# Patient Record
Sex: MALE | Race: WHITE | NOT HISPANIC OR LATINO | Employment: FULL TIME
[De-identification: names, ages, dates, MRNs, and addresses within clinical notes are randomized per-mention and may not be internally consistent; named-entity substitution may affect disease eponyms.]

---

## 2018-11-23 ENCOUNTER — IMAGING SERVICES (OUTPATIENT)
Dept: OTHER | Age: 53
End: 2018-11-23

## 2019-01-24 ENCOUNTER — IMAGING SERVICES (OUTPATIENT)
Dept: OTHER | Age: 54
End: 2019-01-24

## 2019-11-07 ENCOUNTER — OFFICE VISIT (OUTPATIENT)
Dept: GASTROENTEROLOGY | Age: 54
End: 2019-11-07

## 2019-11-07 ENCOUNTER — TELEPHONE (OUTPATIENT)
Dept: GASTROENTEROLOGY | Age: 54
End: 2019-11-07

## 2019-11-07 VITALS
HEIGHT: 70 IN | SYSTOLIC BLOOD PRESSURE: 126 MMHG | DIASTOLIC BLOOD PRESSURE: 74 MMHG | WEIGHT: 150 LBS | BODY MASS INDEX: 21.47 KG/M2

## 2019-11-07 DIAGNOSIS — Z12.11 SPECIAL SCREENING FOR MALIGNANT NEOPLASMS, COLON: ICD-10-CM

## 2019-11-07 DIAGNOSIS — R14.0 ABDOMINAL BLOATING: Primary | ICD-10-CM

## 2019-11-07 DIAGNOSIS — Z80.0 FAMILY HISTORY OF PANCREATIC CANCER: ICD-10-CM

## 2019-11-07 DIAGNOSIS — Z12.11 SPECIAL SCREENING FOR MALIGNANT NEOPLASMS, COLON: Primary | ICD-10-CM

## 2019-11-07 PROCEDURE — 99204 OFFICE O/P NEW MOD 45 MIN: CPT | Performed by: INTERNAL MEDICINE

## 2019-11-07 ASSESSMENT — ENCOUNTER SYMPTOMS
ANAL BLEEDING: 0
TREMORS: 0
BLOOD IN STOOL: 0
CONFUSION: 0
APPETITE CHANGE: 0
SEIZURES: 0
BACK PAIN: 0
FATIGUE: 0
UNEXPECTED WEIGHT CHANGE: 0
RECTAL PAIN: 0
SHORTNESS OF BREATH: 0
ABDOMINAL DISTENTION: 1
DIARRHEA: 0
COLOR CHANGE: 0
CONSTIPATION: 0
HEADACHES: 0
NAUSEA: 0
ABDOMINAL PAIN: 0
COUGH: 0
BRUISES/BLEEDS EASILY: 0
SORE THROAT: 0
CHOKING: 0
CHEST TIGHTNESS: 0
DIAPHORESIS: 0
VOMITING: 0
CHILLS: 0
SPEECH DIFFICULTY: 0
EYE PAIN: 0
EYE REDNESS: 0
LIGHT-HEADEDNESS: 0

## 2019-11-11 ENCOUNTER — E-ADVICE (OUTPATIENT)
Dept: GASTROENTEROLOGY | Age: 54
End: 2019-11-11

## 2019-11-11 RX ORDER — SIMETHICONE 125 MG
TABLET,CHEWABLE ORAL
Qty: 2 TABLET | Refills: 0 | Status: SHIPPED | OUTPATIENT
Start: 2019-11-11 | End: 2019-12-26

## 2019-11-11 RX ORDER — BISACODYL 5 MG/1
TABLET, DELAYED RELEASE ORAL
Qty: 2 TABLET | Refills: 0 | Status: SHIPPED | OUTPATIENT
Start: 2019-11-11 | End: 2019-12-26

## 2019-11-25 ENCOUNTER — OFFICE VISIT (OUTPATIENT)
Dept: GASTROENTEROLOGY | Age: 54
End: 2019-11-25
Attending: INTERNAL MEDICINE

## 2019-11-25 DIAGNOSIS — D12.3 BENIGN NEOPLASM OF TRANSVERSE COLON: ICD-10-CM

## 2019-11-25 DIAGNOSIS — Z80.0 FHX: COLON CANCER: ICD-10-CM

## 2019-11-25 DIAGNOSIS — Z12.11 SPECIAL SCREENING FOR MALIGNANT NEOPLASMS, COLON: Primary | ICD-10-CM

## 2019-11-25 DIAGNOSIS — D12.5 BENIGN NEOPLASM OF SIGMOID COLON: ICD-10-CM

## 2019-11-25 DIAGNOSIS — Z12.11 ENCOUNTER FOR SCREENING FOR MALIGNANT NEOPLASM OF COLON: ICD-10-CM

## 2019-11-25 PROCEDURE — 45380 COLONOSCOPY AND BIOPSY: CPT | Performed by: INTERNAL MEDICINE

## 2019-11-27 LAB — AP REPORT: NORMAL

## 2021-02-22 ENCOUNTER — MED REC SCAN ONLY (OUTPATIENT)
Dept: FAMILY MEDICINE CLINIC | Facility: CLINIC | Age: 56
End: 2021-02-22

## 2021-03-10 ENCOUNTER — TELEPHONE (OUTPATIENT)
Dept: FAMILY MEDICINE CLINIC | Facility: CLINIC | Age: 56
End: 2021-03-10

## 2021-03-10 NOTE — TELEPHONE ENCOUNTER
LINDA--PT HAS APPT HERE 3/17 FOR F/U FROM SURGERY--WILL DROP OFF RECORDS FROM RECENT SURGERIES HE HAD SO  CAN LOOK OVER BEFORE HIS APPT

## 2021-03-16 ENCOUNTER — TELEPHONE (OUTPATIENT)
Dept: FAMILY MEDICINE CLINIC | Facility: CLINIC | Age: 56
End: 2021-03-16

## 2021-03-17 ENCOUNTER — OFFICE VISIT (OUTPATIENT)
Dept: FAMILY MEDICINE CLINIC | Facility: CLINIC | Age: 56
End: 2021-03-17
Payer: COMMERCIAL

## 2021-03-17 VITALS
HEART RATE: 97 BPM | SYSTOLIC BLOOD PRESSURE: 116 MMHG | HEIGHT: 71 IN | RESPIRATION RATE: 14 BRPM | WEIGHT: 143 LBS | BODY MASS INDEX: 20.02 KG/M2 | OXYGEN SATURATION: 99 % | DIASTOLIC BLOOD PRESSURE: 78 MMHG | TEMPERATURE: 98 F

## 2021-03-17 DIAGNOSIS — Z95.1 S/P CABG X 4: ICD-10-CM

## 2021-03-17 DIAGNOSIS — I25.110 CORONARY ARTERY DISEASE INVOLVING NATIVE CORONARY ARTERY OF NATIVE HEART WITH UNSTABLE ANGINA PECTORIS (HCC): Primary | ICD-10-CM

## 2021-03-17 DIAGNOSIS — Z87.19 S/P SMALL BOWEL OBSTRUCTION: ICD-10-CM

## 2021-03-17 LAB
CHOLESTEROL, TOTAL: 127
HDL CHOL: 34
LDL CHOLESTEROL: 79 MG/DL (ref ?–130)
TRIGLYCERIDES: 153

## 2021-03-17 PROCEDURE — 3008F BODY MASS INDEX DOCD: CPT | Performed by: FAMILY MEDICINE

## 2021-03-17 PROCEDURE — 3074F SYST BP LT 130 MM HG: CPT | Performed by: FAMILY MEDICINE

## 2021-03-17 PROCEDURE — 99204 OFFICE O/P NEW MOD 45 MIN: CPT | Performed by: FAMILY MEDICINE

## 2021-03-17 PROCEDURE — 3078F DIAST BP <80 MM HG: CPT | Performed by: FAMILY MEDICINE

## 2021-03-17 RX ORDER — ENALAPRIL MALEATE 2.5 MG/1
TABLET ORAL
COMMUNITY
Start: 2021-03-06

## 2021-03-17 RX ORDER — ASPIRIN 81 MG/1
81 TABLET ORAL DAILY
COMMUNITY

## 2021-03-17 RX ORDER — ISOSORBIDE MONONITRATE 30 MG/1
TABLET, EXTENDED RELEASE ORAL
COMMUNITY
Start: 2021-02-26 | End: 2021-03-17 | Stop reason: ALTCHOICE

## 2021-03-17 RX ORDER — DOCUSATE SODIUM 100 MG/1
100 CAPSULE, LIQUID FILLED ORAL EVERY 12 HOURS
COMMUNITY

## 2021-03-17 RX ORDER — PANTOPRAZOLE SODIUM 40 MG/1
TABLET, DELAYED RELEASE ORAL
COMMUNITY
Start: 2021-03-06 | End: 2021-03-17 | Stop reason: ALTCHOICE

## 2021-03-17 RX ORDER — HYDROCODONE BITARTRATE AND ACETAMINOPHEN 5; 325 MG/1; MG/1
1 TABLET ORAL EVERY 8 HOURS PRN
COMMUNITY

## 2021-03-17 RX ORDER — METOPROLOL SUCCINATE 25 MG/1
TABLET, EXTENDED RELEASE ORAL
COMMUNITY
Start: 2021-03-06

## 2021-03-17 RX ORDER — ACETAMINOPHEN 325 MG/1
325 TABLET ORAL EVERY 4 HOURS PRN
COMMUNITY

## 2021-03-17 RX ORDER — FUROSEMIDE 20 MG/1
TABLET ORAL
COMMUNITY
Start: 2021-03-06 | End: 2021-03-17 | Stop reason: ALTCHOICE

## 2021-03-17 RX ORDER — ATORVASTATIN CALCIUM 40 MG/1
TABLET, FILM COATED ORAL
COMMUNITY
Start: 2021-02-26

## 2021-03-17 NOTE — PATIENT INSTRUCTIONS
I reviewed available discharge paperwork that patient provided. We will ask for release of medical records for additional information.   Follow-up with cardiologist as discussed  Reviewed rationale for each medication  Recommend wean Norco as tolerated  Isidra Aguillon

## 2021-03-17 NOTE — PROGRESS NOTES
HPI/Subjective:   Patient ID: Ingrid Sharma is a 54year old male. Patient presents with:   Follow - Up  Establish Care  CAD    Pt has not been seen here since 2015  Here w/ his wife  Patient states that approximately 4 to 6 weeks ago he was developing daily.     • HYDROcodone-acetaminophen 5-325 MG Oral Tab Take 1 tablet by mouth every 8 (eight) hours as needed for Pain. • acetaminophen 325 MG Oral Tab Take 325 mg by mouth every 4 (four) hours as needed for Pain.      • Enalapril Maleate 2.5 MG Oral records for additional information. Follow-up with cardiologist as discussed  Reviewed rationale for each medication  Recommend wean Norco as tolerated  Discussed importance of annual preventative health examinations    Oliveira Alethea.  Kwaku Vega DO, FAAFP    Or

## 2022-06-22 ENCOUNTER — MED REC SCAN ONLY (OUTPATIENT)
Dept: FAMILY MEDICINE CLINIC | Facility: CLINIC | Age: 57
End: 2022-06-22

## 2022-07-14 ENCOUNTER — MED REC SCAN ONLY (OUTPATIENT)
Dept: FAMILY MEDICINE CLINIC | Facility: CLINIC | Age: 57
End: 2022-07-14

## 2023-01-24 ENCOUNTER — TELEPHONE (OUTPATIENT)
Dept: FAMILY MEDICINE CLINIC | Facility: CLINIC | Age: 58
End: 2023-01-24

## 2023-01-25 ENCOUNTER — MED REC SCAN ONLY (OUTPATIENT)
Dept: FAMILY MEDICINE CLINIC | Facility: CLINIC | Age: 58
End: 2023-01-25

## 2023-01-25 NOTE — TELEPHONE ENCOUNTER
Please advise patient that I received his labs. His lipids are at target, continue statin therapy as prescribed and recheck annually. The HDL/good cholesterol is low but other numbers are at target, vitamin D is low normal, would recommend starting over-the-counter vitamin D3 supplement 5000 IU daily      Urban Sharma

## 2023-02-23 ENCOUNTER — PATIENT OUTREACH (OUTPATIENT)
Dept: FAMILY MEDICINE CLINIC | Facility: CLINIC | Age: 58
End: 2023-02-23

## 2023-04-13 ENCOUNTER — MED REC SCAN ONLY (OUTPATIENT)
Dept: FAMILY MEDICINE CLINIC | Facility: CLINIC | Age: 58
End: 2023-04-13

## 2024-07-02 ENCOUNTER — EXTERNAL RECORD (OUTPATIENT)
Dept: HEALTH INFORMATION MANAGEMENT | Facility: OTHER | Age: 59
End: 2024-07-02

## 2024-07-03 ENCOUNTER — TELEPHONE (OUTPATIENT)
Dept: FAMILY MEDICINE CLINIC | Facility: CLINIC | Age: 59
End: 2024-07-03

## 2024-07-03 ENCOUNTER — TELEPHONE (OUTPATIENT)
Dept: UROLOGY | Age: 59
End: 2024-07-03

## 2024-07-03 NOTE — TELEPHONE ENCOUNTER
If the lesions are draining, calamine lotion will help dry them up.  Please get more information including onset of symptoms, current treatment, was medical evaluation done, who made a diagnosis?

## 2024-07-03 NOTE — TELEPHONE ENCOUNTER
He has shingles and wants to know if he can get some cream that will not interact with the medication he takes

## 2024-07-03 NOTE — TELEPHONE ENCOUNTER
Spoke with pt's wife. States he developed pain in back a few days ago. Went to [facility] ER yesterday for pain because he thought he had a kidney stone. The rash also appeared yesterday.  ER did a CT scan---he does have a large kidney stone (but on the other side of where pain is), a UTI and they looked at the rash and said he had shingles (she doesn't think a viral culture was done).    He was prescribed Norco, Bactrim and valacyclovir 1gm TID x7days.

## 2024-07-03 NOTE — TELEPHONE ENCOUNTER
See below.  Pt has not been seen here since 3/17/21    Can he use calamine lotion to help dry up the areas?

## 2024-07-08 ENCOUNTER — IMAGING SERVICES (OUTPATIENT)
Dept: GENERAL RADIOLOGY | Age: 59
End: 2024-07-08
Attending: UROLOGY

## 2024-07-08 ENCOUNTER — TELEPHONE (OUTPATIENT)
Dept: UROLOGY | Age: 59
End: 2024-07-08

## 2024-07-08 ENCOUNTER — OFFICE VISIT (OUTPATIENT)
Dept: UROLOGY | Age: 59
End: 2024-07-08

## 2024-07-08 ENCOUNTER — APPOINTMENT (OUTPATIENT)
Dept: UROLOGY | Age: 59
End: 2024-07-08

## 2024-07-08 VITALS — BODY MASS INDEX: 22.9 KG/M2 | WEIGHT: 160 LBS | HEIGHT: 70 IN | TEMPERATURE: 97.8 F

## 2024-07-08 DIAGNOSIS — N20.0 KIDNEY STONE: ICD-10-CM

## 2024-07-08 DIAGNOSIS — N20.0 KIDNEY STONE: Primary | ICD-10-CM

## 2024-07-08 PROCEDURE — 99244 OFF/OP CNSLTJ NEW/EST MOD 40: CPT | Performed by: UROLOGY

## 2024-07-08 PROCEDURE — 74018 RADEX ABDOMEN 1 VIEW: CPT | Performed by: RADIOLOGY

## 2024-07-08 RX ORDER — ASPIRIN 81 MG/1
TABLET, CHEWABLE ORAL
COMMUNITY
Start: 2021-03-07

## 2024-07-08 RX ORDER — ACETAMINOPHEN 325 MG/1
325 TABLET ORAL
COMMUNITY

## 2024-07-08 RX ORDER — EZETIMIBE 10 MG/1
TABLET ORAL
COMMUNITY
Start: 2023-07-07

## 2024-07-08 RX ORDER — SULFAMETHOXAZOLE AND TRIMETHOPRIM 800; 160 MG/1; MG/1
TABLET ORAL
COMMUNITY
Start: 2024-07-02

## 2024-07-08 RX ORDER — ROSUVASTATIN CALCIUM 20 MG/1
TABLET, COATED ORAL
COMMUNITY
Start: 2021-03-07

## 2024-07-08 RX ORDER — TAMSULOSIN HYDROCHLORIDE 0.4 MG/1
0.4 CAPSULE ORAL
Qty: 30 CAPSULE | Refills: 0 | Status: SHIPPED | OUTPATIENT
Start: 2024-07-08

## 2024-07-08 RX ORDER — VALACYCLOVIR HYDROCHLORIDE 1 G/1
TABLET, FILM COATED ORAL
COMMUNITY
Start: 2024-07-02

## 2024-07-09 ENCOUNTER — TELEPHONE (OUTPATIENT)
Dept: UROLOGY | Age: 59
End: 2024-07-09

## 2024-07-09 DIAGNOSIS — N20.0 KIDNEY STONE: Primary | ICD-10-CM

## 2024-07-09 RX ORDER — TAMSULOSIN HYDROCHLORIDE 0.4 MG/1
0.4 CAPSULE ORAL
Qty: 90 CAPSULE | OUTPATIENT
Start: 2024-07-09

## 2024-07-11 ENCOUNTER — TELEPHONE (OUTPATIENT)
Dept: FAMILY MEDICINE CLINIC | Facility: CLINIC | Age: 59
End: 2024-07-11

## 2024-07-11 NOTE — TELEPHONE ENCOUNTER
Rec'd pre-op orders from [facility]/ Urology ---for procedure scheduled on 7/29.    PER DR. SETHI----PT SHOULD ALSO GET A NOTE FROM HIS CARDIOLOGIST (cardiac clearance).    L/M ON PT'S V/M TO C/B TO SCHEDULE A PRE-OP PX  (orders in [facility] basket for now---then will put in blue folder)

## 2024-07-15 NOTE — TELEPHONE ENCOUNTER
Spoke with pt.  Pre-op px scheduled on 7/17/24.    Pt will call his cardiologists office. He was seen there in May.  He will have cardiac clearance letter faxed to our office

## 2024-07-17 ENCOUNTER — LABORATORY ENCOUNTER (OUTPATIENT)
Dept: LAB | Age: 59
End: 2024-07-17
Attending: FAMILY MEDICINE
Payer: COMMERCIAL

## 2024-07-17 ENCOUNTER — EXTERNAL RECORD (OUTPATIENT)
Dept: HEALTH INFORMATION MANAGEMENT | Facility: OTHER | Age: 59
End: 2024-07-17

## 2024-07-17 ENCOUNTER — OFFICE VISIT (OUTPATIENT)
Dept: FAMILY MEDICINE CLINIC | Facility: CLINIC | Age: 59
End: 2024-07-17
Payer: COMMERCIAL

## 2024-07-17 VITALS
OXYGEN SATURATION: 98 % | SYSTOLIC BLOOD PRESSURE: 118 MMHG | BODY MASS INDEX: 22.65 KG/M2 | DIASTOLIC BLOOD PRESSURE: 82 MMHG | HEART RATE: 60 BPM | HEIGHT: 70 IN | RESPIRATION RATE: 18 BRPM | WEIGHT: 158.19 LBS | TEMPERATURE: 99 F

## 2024-07-17 DIAGNOSIS — N20.0 KIDNEY STONE ON LEFT SIDE: ICD-10-CM

## 2024-07-17 DIAGNOSIS — I25.10 CORONARY ARTERY DISEASE INVOLVING NATIVE CORONARY ARTERY OF NATIVE HEART WITHOUT ANGINA PECTORIS: ICD-10-CM

## 2024-07-17 DIAGNOSIS — E78.00 HYPERCHOLESTEROLEMIA: ICD-10-CM

## 2024-07-17 DIAGNOSIS — Z12.5 SCREENING FOR PROSTATE CANCER: ICD-10-CM

## 2024-07-17 DIAGNOSIS — Z01.818 PRE-OP EVALUATION: Primary | ICD-10-CM

## 2024-07-17 DIAGNOSIS — B02.9 HERPES ZOSTER WITHOUT COMPLICATION: ICD-10-CM

## 2024-07-17 DIAGNOSIS — Z95.1 S/P CABG X 4: ICD-10-CM

## 2024-07-17 PROBLEM — I25.9 CHRONIC ISCHEMIC HEART DISEASE: Status: ACTIVE | Noted: 2024-07-17

## 2024-07-17 PROBLEM — I10 ESSENTIAL HYPERTENSION: Status: ACTIVE | Noted: 2024-07-17

## 2024-07-17 LAB
ATRIAL RATE: 55 BPM
COMPLEXED PSA SERPL-MCNC: 1.64 NG/ML (ref ?–4)
P AXIS: 63 DEGREES
P-R INTERVAL: 142 MS
Q-T INTERVAL: 398 MS
QRS DURATION: 92 MS
QTC CALCULATION (BEZET): 380 MS
R AXIS: 41 DEGREES
T AXIS: 62 DEGREES
VENTRICULAR RATE: 55 BPM

## 2024-07-17 PROCEDURE — 3079F DIAST BP 80-89 MM HG: CPT | Performed by: FAMILY MEDICINE

## 2024-07-17 PROCEDURE — 3008F BODY MASS INDEX DOCD: CPT | Performed by: FAMILY MEDICINE

## 2024-07-17 PROCEDURE — 3074F SYST BP LT 130 MM HG: CPT | Performed by: FAMILY MEDICINE

## 2024-07-17 PROCEDURE — 99244 OFF/OP CNSLTJ NEW/EST MOD 40: CPT | Performed by: FAMILY MEDICINE

## 2024-07-17 PROCEDURE — G0103 PSA SCREENING: HCPCS | Performed by: FAMILY MEDICINE

## 2024-07-17 PROCEDURE — 93000 ELECTROCARDIOGRAM COMPLETE: CPT | Performed by: FAMILY MEDICINE

## 2024-07-17 RX ORDER — ROSUVASTATIN CALCIUM 20 MG/1
20 TABLET, COATED ORAL NIGHTLY
COMMUNITY
Start: 2024-06-25

## 2024-07-17 RX ORDER — EZETIMIBE 10 MG/1
10 TABLET ORAL NIGHTLY
COMMUNITY
Start: 2023-07-07

## 2024-07-17 NOTE — H&P
Aman Victoria is a 59 year old male   Chief Complaint   Patient presents with    Pre-Op     Cystoscopy    Establish Care     Pt has not been seen since March 2021     HPI:   Pt presents for preoperative consultation as requested by Dr Crump.  Patient is scheduled for cystoscopy, left ureteroscopy with laser lithotripsy, stone manipulation and basketing and possible left ureteral stent placement on 7/29/2024 at [facility]  Patient presented to emergency room on 7/2/2024 with right flank pain.  CT scan showed 6 x 7 x 9 mm stone in the left proximal ureter with severe hydroureteronephrosis.  CBC and BMP were unremarkable, pt diagnosed w/ shingles-started on Valtrex, rash resolving but has nerve pain, takes tylenol, getting better  Patient reports he received cardiac clearance for the upcoming procedure however records are not available at the time of this visit.  Wt Readings from Last 6 Encounters:   07/17/24 158 lb 3.2 oz (71.8 kg)   03/17/21 143 lb (64.9 kg)   05/22/15 139 lb 2 oz (63.1 kg)     Body mass index is 22.7 kg/m².     Cholesterol, Total (mg/dL)   Date Value   05/30/2015 159     CHOLESTEROL, TOTAL (no units)   Date Value   02/19/2021 127     HDL Cholesterol (mg/dL)   Date Value   05/30/2015 30 (L)     HDL CHOL (no units)   Date Value   02/19/2021 34     LDL Cholesterol (mg/dL)   Date Value   02/19/2021 79   05/30/2015 111     AST (U/L)   Date Value   05/30/2015 16     ALT (U/L)   Date Value   05/30/2015 17      Current Outpatient Medications   Medication Sig Dispense Refill    ezetimibe 10 MG Oral Tab Take 1 tablet (10 mg total) by mouth nightly.      rosuvastatin 20 MG Oral Tab Take 1 tablet (20 mg total) by mouth nightly.      cholecalciferol (VITAMIN D3) 125 MCG (5000 UT) Oral Cap Take 1 capsule (5,000 Units total) by mouth daily. 1 capsule 0    aspirin 81 MG Oral Tab EC Take 1 tablet (81 mg total) by mouth daily.      acetaminophen 325 MG Oral Tab Take 1 tablet (325 mg total)  by mouth every 4 (four) hours as needed for Pain.      HYDROcodone-acetaminophen 5-325 MG Oral Tab Take 1 tablet by mouth every 8 (eight) hours as needed for Pain. (Patient not taking: Reported on 7/17/2024)       Allergies   Allergen Reactions    Penicillins SWELLING     hands  hands          Past Medical History:    Atherosclerosis of coronary artery    Calculus of kidney      Past Surgical History:   Procedure Laterality Date    Cabg  03/01/2021    X 4    Colonoscopy  11/25/2019    @ [facility], hyperplastic polyps    Hc cta  heart coronary arteries bypass grafts with contrast and 3d  03/16/2023    Severe three-vessel disease with patent grafts    Laparoscopy,lysis adhesns  02/17/2021    Us carotid doppler - diag img (cpt=93880)  02/17/2021      Family History   Problem Relation Age of Onset    No Known Problems Mother     Cancer Father         pancreas    No Known Problems Sister       Social History:  Social History     Socioeconomic History    Marital status:    Tobacco Use    Smoking status: Never     Passive exposure: Never    Smokeless tobacco: Never   Vaping Use    Vaping status: Never Used   Substance and Sexual Activity    Alcohol use: No    Drug use: No     Social Determinants of Health      Received from [facility]    Social Connections    Received from [place]    [facility]    Cardiology: Dr Anders  Occ:  for [employer]. : +. Children: 2.   Exercise: walking.  Diet:  generally healthy     REVIEW OF SYSTEMS:   GENERAL: generally feels well, no fatigue, denies excessive daytime drowsiness, good appetite  SKIN: Resolving/drying rash of shingles on the right flank and abdomen  EYES:denies blurred vision or double vision, glasses/ contacts: +, last eye exam:annually  ENT: denies nasal congestion, PND or ST, denies snoring and reported periods of apnea, denies hearing deficits  LUNGS: denies shortness of breath with exertion,  no coughing or wheezing  CARDIOVASCULAR: denies chest pain on exertion, palpations, anginal equivalent symptoms, no claudication , no peripheral edema  GI: denies abdominal pain,denies heartburn, constipation, diarrhea, or change in bowel habits, small incisional hernias from prior chest tubes that he intends to have fixed in the future  : denies dysuria, hesitancy,nocturia, decreased urine stream, incontinence, erectile dysfunction, decreased libido   MUSCULOSKELETAL: denies back or joint pain  NEURO: denies headaches, tremors, dizziness, numbness and weakness, patient admits to some discomfort that is improving from his shingles, Tylenol effective  PSYCHE: denies depression or anxiety, denies any sleep difficulty,   HEMATOLOGIC: denies unexplained bruising or bleeding  ENDOCRINE: denies heat or cold intolerance , no unexplained wt loss or gain  EXAM:   /82 (BP Location: Left arm, Patient Position: Sitting, Cuff Size: adult)   Pulse 60   Temp 98.7 °F (37.1 °C) (Temporal)   Resp 18   Ht 5' 10\" (1.778 m)   Wt 158 lb 3.2 oz (71.8 kg)   SpO2 98%   BMI 22.70 kg/m²   Body mass index is 22.7 kg/m².   GENERAL: well developed, well nourished,in no apparent distress  SKIN: Mildly erythematous rash along the right flank and upper abdomen with drying lesions, no drainage noted  ENT: EAC:  Clear, TMs: intact, Nose: turbinates normal, septum: midline, discharge: none, Pharynx: class 1 airway, no oral lesions  EYES:PERRLA, EOMI, normal optic disk,conjunctiva are clear  NECK: supple,no adenopathy,no bruits, no thyromegaly  LUNGS: clear to auscultation, no w/r/r  CARDIO: RRR without murmur, no S3, S4, strong peripheral pulses, no peripheral edema  GI: +NBS's,no masses, HSM or tenderness, small reducible incisional hernia upper abdomen  : Deferred to recent urology exam  BACK:  : FROM, No lateral curve  EXTREMITIES: no cyanosis, clubbing or edema, FROM of all joints tested  NEURO: A &O X 3,cranial nerves are  intact,motor and sensory are grossly intact, DTRs +2/4 UE/LE bilaterally  PSYCH: affect: bright, speech: clear and coherent, Insight: appropriate  EKG: Sinus bradycardia, heart rate 55, IL interval 0.14, QRS interval 0.09, QRS axis +41 degrees  ASSESSMENT AND PLAN:   Aman Vitcoria is a 59 year old male   Encounter Diagnoses   Name Primary?    Pre-op evaluation Yes    Kidney stone on left side     Coronary artery disease involving native coronary artery of native heart without angina pectoris     S/P CABG x 4- 3/1/21     Hypercholesterolemia     Screening for prostate cancer     Herpes zoster without complication - onset 7/2/24      I reviewed emergency room records and urology consultation.  Patient advised to begin Flomax as prescribed  Hold aspirin and all NSAID containing products including ibuprofen, Motrin, Aleve, naproxen, Advil 10 days prior to planned procedure  I reviewed age-appropriate preventative health screening exams, PSA drawn  I have asked patient to check his employment records for date of last Tdap booster  I discussed shingles including infection control, contagiousness, skin care and pain management.  Patient declines need for anything beyond Tylenol for pain.  Pending review of cardiac clearance, patient is medically optimized for procedure as scheduled and may proceed with surgery as planned.  Copy of this consultation will be forwarded to  for review    Derick Douglas DO, FAAFP    Orders Placed This Encounter   Procedures    PSA, Total (Screening) [E]     Meds & Refills for this Visit:  Requested Prescriptions      No prescriptions requested or ordered in this encounter     Imaging & Consults:  ELECTROCARDIOGRAM, COMPLETE  No follow-ups on file.  There are no Patient Instructions on file for this visit.

## 2024-07-18 DIAGNOSIS — Z12.5 SCREENING FOR PROSTATE CANCER: Primary | ICD-10-CM

## 2024-07-29 ENCOUNTER — EXTERNAL RECORD (OUTPATIENT)
Dept: UROLOGY | Age: 59
End: 2024-07-29

## 2024-07-29 ENCOUNTER — APPOINTMENT (OUTPATIENT)
Dept: OTHER | Facility: PHYSICIAN GROUP | Age: 59
End: 2024-07-29
Attending: UROLOGY

## 2024-07-29 DIAGNOSIS — N20.1 URETERAL STONE: Primary | ICD-10-CM

## 2024-07-29 PROCEDURE — 52356 CYSTO/URETERO W/LITHOTRIPSY: CPT | Performed by: UROLOGY

## 2024-08-01 ENCOUNTER — TELEPHONE (OUTPATIENT)
Dept: UROLOGY | Age: 59
End: 2024-08-01

## 2024-08-13 ENCOUNTER — APPOINTMENT (OUTPATIENT)
Dept: UROLOGY | Age: 59
End: 2024-08-13

## 2024-08-13 VITALS — WEIGHT: 160 LBS | TEMPERATURE: 97.9 F | BODY MASS INDEX: 22.9 KG/M2 | HEIGHT: 70 IN

## 2024-08-13 DIAGNOSIS — N20.0 NEPHROLITHIASIS: Primary | ICD-10-CM

## 2024-08-13 PROCEDURE — 99214 OFFICE O/P EST MOD 30 MIN: CPT | Performed by: UROLOGY

## 2024-08-13 RX ORDER — TAMSULOSIN HYDROCHLORIDE 0.4 MG/1
0.4 CAPSULE ORAL
Qty: 90 CAPSULE | Refills: 0 | Status: SHIPPED | OUTPATIENT
Start: 2024-08-13

## 2024-08-13 RX ORDER — TAMSULOSIN HYDROCHLORIDE 0.4 MG/1
0.4 CAPSULE ORAL
Qty: 30 CAPSULE | Refills: 0 | Status: SHIPPED | OUTPATIENT
Start: 2024-08-13 | End: 2024-08-13

## 2024-08-14 ENCOUNTER — TELEPHONE (OUTPATIENT)
Dept: UROLOGY | Age: 59
End: 2024-08-14

## 2024-08-15 ENCOUNTER — TELEPHONE (OUTPATIENT)
Dept: UROLOGY | Age: 59
End: 2024-08-15

## 2024-08-15 DIAGNOSIS — Z01.818 PREOPERATIVE TESTING: ICD-10-CM

## 2024-08-15 DIAGNOSIS — N20.0 NEPHROLITHIASIS: Primary | ICD-10-CM

## 2024-08-17 ENCOUNTER — LAB SERVICES (OUTPATIENT)
Dept: LAB | Age: 59
End: 2024-08-17

## 2024-08-17 DIAGNOSIS — Z01.818 PREOPERATIVE TESTING: ICD-10-CM

## 2024-08-17 DIAGNOSIS — N20.0 NEPHROLITHIASIS: ICD-10-CM

## 2024-08-17 LAB
APPEARANCE UR: ABNORMAL
BACTERIA #/AREA URNS HPF: ABNORMAL /HPF
BILIRUB UR QL STRIP: NEGATIVE
COLOR UR: ABNORMAL
GLUCOSE UR STRIP-MCNC: NEGATIVE MG/DL
HGB UR QL STRIP: ABNORMAL
HYALINE CASTS #/AREA URNS LPF: ABNORMAL /LPF
KETONES UR STRIP-MCNC: NEGATIVE MG/DL
LEUKOCYTE ESTERASE UR QL STRIP: ABNORMAL
MUCOUS THREADS URNS QL MICRO: PRESENT
NITRITE UR QL STRIP: NEGATIVE
PH UR STRIP: 6 [PH] (ref 5–7)
PROT UR STRIP-MCNC: 30 MG/DL
RBC #/AREA URNS HPF: ABNORMAL /HPF
SP GR UR STRIP: 1.02 (ref 1–1.03)
SQUAMOUS #/AREA URNS HPF: ABNORMAL /HPF
UROBILINOGEN UR STRIP-MCNC: 0.2 MG/DL
WBC #/AREA URNS HPF: ABNORMAL /HPF

## 2024-08-17 PROCEDURE — 81001 URINALYSIS AUTO W/SCOPE: CPT | Performed by: INTERNAL MEDICINE

## 2024-08-17 PROCEDURE — 87086 URINE CULTURE/COLONY COUNT: CPT | Performed by: CLINICAL MEDICAL LABORATORY

## 2024-08-18 LAB — BACTERIA UR CULT: NO GROWTH

## 2024-08-26 ENCOUNTER — EXTERNAL RECORD (OUTPATIENT)
Dept: UROLOGY | Age: 59
End: 2024-08-26

## 2024-08-26 ENCOUNTER — MED REC SCAN ONLY (OUTPATIENT)
Dept: FAMILY MEDICINE CLINIC | Facility: CLINIC | Age: 59
End: 2024-08-26

## 2024-08-26 ENCOUNTER — APPOINTMENT (OUTPATIENT)
Dept: OTHER | Facility: PHYSICIAN GROUP | Age: 59
End: 2024-08-26
Attending: UROLOGY

## 2024-08-26 DIAGNOSIS — N20.0 NEPHROLITHIASIS: Primary | ICD-10-CM

## 2024-08-26 PROCEDURE — 52356 CYSTO/URETERO W/LITHOTRIPSY: CPT | Performed by: UROLOGY

## 2024-09-05 ENCOUNTER — E-ADVICE (OUTPATIENT)
Dept: UROLOGY | Age: 59
End: 2024-09-05

## 2024-09-19 ENCOUNTER — APPOINTMENT (OUTPATIENT)
Dept: UROLOGY | Age: 59
End: 2024-09-19

## 2024-09-19 ENCOUNTER — OFFICE VISIT (OUTPATIENT)
Dept: UROLOGY | Age: 59
End: 2024-09-19

## 2024-09-19 DIAGNOSIS — N20.0 NEPHROLITHIASIS: Primary | ICD-10-CM

## 2024-09-19 PROCEDURE — 52310 CYSTOSCOPY AND TREATMENT: CPT | Performed by: UROLOGY

## 2024-09-19 RX ORDER — SULFAMETHOXAZOLE/TRIMETHOPRIM 800-160 MG
1 TABLET ORAL 2 TIMES DAILY
Qty: 6 TABLET | Refills: 0 | Status: SHIPPED | OUTPATIENT
Start: 2024-09-19 | End: 2024-09-22

## 2024-12-10 ENCOUNTER — MED REC SCAN ONLY (OUTPATIENT)
Dept: FAMILY MEDICINE CLINIC | Facility: CLINIC | Age: 59
End: 2024-12-10

## 2024-12-16 ENCOUNTER — TELEPHONE (OUTPATIENT)
Dept: CARDIOLOGY | Age: 59
End: 2024-12-16

## 2024-12-23 ENCOUNTER — APPOINTMENT (OUTPATIENT)
Dept: ULTRASOUND IMAGING | Age: 59
End: 2024-12-23
Attending: UROLOGY

## 2025-01-02 ENCOUNTER — APPOINTMENT (OUTPATIENT)
Dept: UROLOGY | Age: 60
End: 2025-01-02

## 2025-01-21 ENCOUNTER — APPOINTMENT (OUTPATIENT)
Dept: ULTRASOUND IMAGING | Age: 60
End: 2025-01-21

## 2025-02-05 ENCOUNTER — APPOINTMENT (OUTPATIENT)
Dept: ULTRASOUND IMAGING | Age: 60
End: 2025-02-05

## 2025-02-05 DIAGNOSIS — N20.0 NEPHROLITHIASIS: ICD-10-CM

## 2025-02-05 PROCEDURE — 76770 US EXAM ABDO BACK WALL COMP: CPT | Performed by: RADIOLOGY

## 2025-02-06 DIAGNOSIS — N28.1 KIDNEY CYSTS: Primary | ICD-10-CM

## 2025-04-04 ENCOUNTER — APPOINTMENT (OUTPATIENT)
Dept: UROLOGY | Age: 60
End: 2025-04-04

## 2025-08-05 ENCOUNTER — APPOINTMENT (OUTPATIENT)
Dept: ULTRASOUND IMAGING | Age: 60
End: 2025-08-05

## 2025-08-05 DIAGNOSIS — N28.1 KIDNEY CYSTS: ICD-10-CM

## 2025-08-05 PROCEDURE — 76770 US EXAM ABDO BACK WALL COMP: CPT | Performed by: RADIOLOGY

## 2025-08-07 ENCOUNTER — E-ADVICE (OUTPATIENT)
Dept: UROLOGY | Age: 60
End: 2025-08-07

## 2026-01-20 ENCOUNTER — APPOINTMENT (OUTPATIENT)
Dept: ULTRASOUND IMAGING | Age: 61
End: 2026-01-20
Attending: UROLOGY

## 2026-02-04 ENCOUNTER — APPOINTMENT (OUTPATIENT)
Dept: UROLOGY | Age: 61
End: 2026-02-04